# Patient Record
Sex: FEMALE | URBAN - METROPOLITAN AREA
[De-identification: names, ages, dates, MRNs, and addresses within clinical notes are randomized per-mention and may not be internally consistent; named-entity substitution may affect disease eponyms.]

---

## 2024-07-05 ENCOUNTER — TELEPHONE (OUTPATIENT)
Age: 29
End: 2024-07-05

## 2024-07-05 NOTE — TELEPHONE ENCOUNTER
Attempted to contact pt to inform her that  is leaving and that she need's to re-establish care with new PCP/phone just ring's busy/vbncheck-up, along with checking in on preexisting conditions
